# Patient Record
Sex: FEMALE | Race: WHITE | NOT HISPANIC OR LATINO | Employment: OTHER | URBAN - METROPOLITAN AREA
[De-identification: names, ages, dates, MRNs, and addresses within clinical notes are randomized per-mention and may not be internally consistent; named-entity substitution may affect disease eponyms.]

---

## 2021-05-14 ENCOUNTER — OFFICE VISIT (OUTPATIENT)
Dept: GASTROENTEROLOGY | Facility: CLINIC | Age: 76
End: 2021-05-14

## 2021-05-14 VITALS
DIASTOLIC BLOOD PRESSURE: 74 MMHG | TEMPERATURE: 97.9 F | HEIGHT: 62 IN | WEIGHT: 161.2 LBS | HEART RATE: 69 BPM | SYSTOLIC BLOOD PRESSURE: 154 MMHG | BODY MASS INDEX: 29.66 KG/M2

## 2021-05-14 DIAGNOSIS — R19.7 DIARRHEA, UNSPECIFIED TYPE: ICD-10-CM

## 2021-05-14 DIAGNOSIS — R10.84 GENERALIZED ABDOMINAL PAIN: Primary | ICD-10-CM

## 2021-05-14 PROCEDURE — 99204 OFFICE O/P NEW MOD 45 MIN: CPT | Performed by: NURSE PRACTITIONER

## 2021-05-14 RX ORDER — DICYCLOMINE HCL 20 MG
1 TABLET ORAL DAILY
COMMUNITY
Start: 2021-03-05 | End: 2021-09-14

## 2021-05-14 RX ORDER — LOSARTAN POTASSIUM 100 MG/1
1 TABLET ORAL DAILY
COMMUNITY
End: 2021-09-14

## 2021-05-14 RX ORDER — ZOLPIDEM TARTRATE 10 MG/1
1 TABLET ORAL DAILY
COMMUNITY
Start: 2021-04-26

## 2021-05-14 RX ORDER — CLONAZEPAM 1 MG/1
1 TABLET ORAL DAILY
COMMUNITY
Start: 2021-04-26

## 2021-05-14 RX ORDER — VERAPAMIL HYDROCHLORIDE 240 MG/1
1 TABLET, FILM COATED, EXTENDED RELEASE ORAL DAILY
COMMUNITY
Start: 2021-03-15

## 2021-05-14 RX ORDER — MONTELUKAST SODIUM 10 MG/1
1 TABLET ORAL DAILY
COMMUNITY
Start: 2021-04-15

## 2021-05-14 RX ORDER — MESALAMINE 1.2 G/1
1200 TABLET, DELAYED RELEASE ORAL 2 TIMES DAILY
COMMUNITY
End: 2021-05-28 | Stop reason: SDUPTHER

## 2021-05-14 RX ORDER — ATORVASTATIN CALCIUM 20 MG/1
1 TABLET, FILM COATED ORAL DAILY
COMMUNITY
Start: 2021-03-15

## 2021-05-17 ENCOUNTER — OUTSIDE FACILITY SERVICE (OUTPATIENT)
Dept: GASTROENTEROLOGY | Facility: CLINIC | Age: 76
End: 2021-05-17

## 2021-05-17 PROCEDURE — 45380 COLONOSCOPY AND BIOPSY: CPT | Performed by: INTERNAL MEDICINE

## 2021-05-28 ENCOUNTER — LAB (OUTPATIENT)
Dept: LAB | Facility: HOSPITAL | Age: 76
End: 2021-05-28

## 2021-05-28 ENCOUNTER — OFFICE VISIT (OUTPATIENT)
Dept: GASTROENTEROLOGY | Facility: CLINIC | Age: 76
End: 2021-05-28

## 2021-05-28 VITALS
TEMPERATURE: 97.7 F | DIASTOLIC BLOOD PRESSURE: 62 MMHG | HEIGHT: 62 IN | BODY MASS INDEX: 30.14 KG/M2 | WEIGHT: 163.8 LBS | HEART RATE: 70 BPM | SYSTOLIC BLOOD PRESSURE: 141 MMHG

## 2021-05-28 DIAGNOSIS — K50.00 CROHN'S DISEASE OF SMALL INTESTINE WITHOUT COMPLICATION (HCC): ICD-10-CM

## 2021-05-28 DIAGNOSIS — K50.00 CROHN'S DISEASE OF SMALL INTESTINE WITHOUT COMPLICATION (HCC): Primary | ICD-10-CM

## 2021-05-28 LAB
ALBUMIN SERPL-MCNC: 4.2 G/DL (ref 3.5–5.2)
ALBUMIN/GLOB SERPL: 1.7 G/DL
ALP SERPL-CCNC: 114 U/L (ref 39–117)
ALT SERPL W P-5'-P-CCNC: 20 U/L (ref 1–33)
ANION GAP SERPL CALCULATED.3IONS-SCNC: 10.1 MMOL/L (ref 5–15)
AST SERPL-CCNC: 22 U/L (ref 1–32)
BASOPHILS # BLD AUTO: 0.05 10*3/MM3 (ref 0–0.2)
BASOPHILS NFR BLD AUTO: 0.6 % (ref 0–1.5)
BILIRUB SERPL-MCNC: 0.6 MG/DL (ref 0–1.2)
BUN SERPL-MCNC: 5 MG/DL (ref 8–23)
BUN/CREAT SERPL: 7.9 (ref 7–25)
CALCIUM SPEC-SCNC: 9.6 MG/DL (ref 8.6–10.5)
CHLORIDE SERPL-SCNC: 100 MMOL/L (ref 98–107)
CO2 SERPL-SCNC: 27.9 MMOL/L (ref 22–29)
CREAT SERPL-MCNC: 0.63 MG/DL (ref 0.57–1)
DEPRECATED RDW RBC AUTO: 46.8 FL (ref 37–54)
EOSINOPHIL # BLD AUTO: 0.27 10*3/MM3 (ref 0–0.4)
EOSINOPHIL NFR BLD AUTO: 3.2 % (ref 0.3–6.2)
ERYTHROCYTE [DISTWIDTH] IN BLOOD BY AUTOMATED COUNT: 13.7 % (ref 12.3–15.4)
GFR SERPL CREATININE-BSD FRML MDRD: 92 ML/MIN/1.73
GLOBULIN UR ELPH-MCNC: 2.5 GM/DL
GLUCOSE SERPL-MCNC: 78 MG/DL (ref 65–99)
HCT VFR BLD AUTO: 42.3 % (ref 34–46.6)
HGB BLD-MCNC: 14.3 G/DL (ref 12–15.9)
IMM GRANULOCYTES # BLD AUTO: 0.03 10*3/MM3 (ref 0–0.05)
IMM GRANULOCYTES NFR BLD AUTO: 0.4 % (ref 0–0.5)
LYMPHOCYTES # BLD AUTO: 2.31 10*3/MM3 (ref 0.7–3.1)
LYMPHOCYTES NFR BLD AUTO: 27.3 % (ref 19.6–45.3)
MCH RBC QN AUTO: 31.4 PG (ref 26.6–33)
MCHC RBC AUTO-ENTMCNC: 33.8 G/DL (ref 31.5–35.7)
MCV RBC AUTO: 93 FL (ref 79–97)
MONOCYTES # BLD AUTO: 0.66 10*3/MM3 (ref 0.1–0.9)
MONOCYTES NFR BLD AUTO: 7.8 % (ref 5–12)
NEUTROPHILS NFR BLD AUTO: 5.15 10*3/MM3 (ref 1.7–7)
NEUTROPHILS NFR BLD AUTO: 60.7 % (ref 42.7–76)
NRBC BLD AUTO-RTO: 0 /100 WBC (ref 0–0.2)
PLATELET # BLD AUTO: 339 10*3/MM3 (ref 140–450)
PMV BLD AUTO: 9.3 FL (ref 6–12)
POTASSIUM SERPL-SCNC: 4.2 MMOL/L (ref 3.5–5.2)
PROT SERPL-MCNC: 6.7 G/DL (ref 6–8.5)
RBC # BLD AUTO: 4.55 10*6/MM3 (ref 3.77–5.28)
SODIUM SERPL-SCNC: 138 MMOL/L (ref 136–145)
WBC # BLD AUTO: 8.47 10*3/MM3 (ref 3.4–10.8)

## 2021-05-28 PROCEDURE — 99214 OFFICE O/P EST MOD 30 MIN: CPT | Performed by: NURSE PRACTITIONER

## 2021-05-28 PROCEDURE — 36415 COLL VENOUS BLD VENIPUNCTURE: CPT

## 2021-05-28 PROCEDURE — 85025 COMPLETE CBC W/AUTO DIFF WBC: CPT

## 2021-05-28 PROCEDURE — 80053 COMPREHEN METABOLIC PANEL: CPT

## 2021-05-28 PROCEDURE — 82542 COL CHROMOTOGRAPHY QUAL/QUAN: CPT

## 2021-05-28 PROCEDURE — 82657 ENZYME CELL ACTIVITY: CPT

## 2021-05-28 RX ORDER — ONDANSETRON 4 MG/1
1 TABLET, FILM COATED ORAL DAILY
COMMUNITY
Start: 2021-05-20

## 2021-05-28 RX ORDER — MESALAMINE 1.2 G/1
2400 TABLET, DELAYED RELEASE ORAL DAILY
Qty: 60 TABLET | Refills: 5 | Status: SHIPPED | OUTPATIENT
Start: 2021-05-28 | End: 2021-09-14

## 2021-06-07 LAB — REF LAB TEST METHOD: NORMAL

## 2021-06-08 RX ORDER — MERCAPTOPURINE 50 MG/1
50 TABLET ORAL DAILY
Qty: 90 TABLET | Refills: 0 | Status: SHIPPED | OUTPATIENT
Start: 2021-06-08 | End: 2021-09-14

## 2021-06-11 ENCOUNTER — TELEPHONE (OUTPATIENT)
Dept: GASTROENTEROLOGY | Facility: CLINIC | Age: 76
End: 2021-06-11

## 2021-06-11 NOTE — TELEPHONE ENCOUNTER
MRS HERNANDEZ CALLED STATES SHE SPOKE WITH TIA YESTERDAY ABOUT SOME NEW MEDICINE THAT SHE IS TO TAKE AND HAS QUESTIONS ABOUT. TIA WAS TO GET BACK WITH HER AND DIDN'T. PATIENT UPSET. PRETTY IS GOING TO SPEAK TO ANTIONETTE AND GIVE MRS. HERNANDEZ A CALL.

## 2021-07-03 ENCOUNTER — TELEPHONE (OUTPATIENT)
Dept: GASTROENTEROLOGY | Facility: OTHER | Age: 76
End: 2021-07-03

## 2021-07-03 NOTE — TELEPHONE ENCOUNTER
Patient called complains of increased diarrhea.  She has history of possible Crohn's disease disease diagnosed by small bowel capsule endoscopy 2 years ago.  She was recently started on 6-MP as well as mesalamine.  She complains of being weak and having a lot of diarrhea.  No fever chills.  States she also has abdominal distention.  I advised her to come to the ER here for further evaluation.  She states would be difficult for her to get here.  She will probably go to the ER at [place].    If she does arrive.  Needs evaluation with stool studies for C. difficile, GI PCR, fecal calprotectin and fecal lactoferrin and probably small bowel enteroscopy based on physical exam and stool studies

## 2021-07-30 ENCOUNTER — OFFICE VISIT (OUTPATIENT)
Dept: GASTROENTEROLOGY | Facility: CLINIC | Age: 76
End: 2021-07-30

## 2021-07-30 VITALS
TEMPERATURE: 97.1 F | WEIGHT: 158 LBS | BODY MASS INDEX: 28.9 KG/M2 | HEART RATE: 67 BPM | SYSTOLIC BLOOD PRESSURE: 164 MMHG | DIASTOLIC BLOOD PRESSURE: 68 MMHG

## 2021-07-30 DIAGNOSIS — R19.7 DIARRHEA, UNSPECIFIED TYPE: ICD-10-CM

## 2021-07-30 DIAGNOSIS — R10.84 GENERALIZED ABDOMINAL PAIN: Primary | ICD-10-CM

## 2021-07-30 DIAGNOSIS — K59.00 CONSTIPATION, UNSPECIFIED CONSTIPATION TYPE: ICD-10-CM

## 2021-07-30 PROCEDURE — 99213 OFFICE O/P EST LOW 20 MIN: CPT | Performed by: NURSE PRACTITIONER

## 2021-07-30 RX ORDER — AMITRIPTYLINE HYDROCHLORIDE 25 MG/1
TABLET, FILM COATED ORAL
COMMUNITY
Start: 2021-07-29 | End: 2021-09-14 | Stop reason: ALTCHOICE

## 2021-08-05 NOTE — PROGRESS NOTES
GASTROENTEROLOGY OFFICE NOTE  Marisela Hartman  [id number]  1945    CARE TEAM  Patient Care Team:  Sissy Farris as PCP - General (Family Medicine)    Referring Provider: Sissy Farris     Chief Complaint   Patient presents with   • Follow-up   • Crohn's Disease        HISTORY OF PRESENT ILLNESS:  Ms. Hartman presents in follow-up for complaints of nausea, vomiting and diarrhea with history of suspected Crohn's disease of the small bowel per capsule endoscopy per Dr. Claudio Riddle.  She has been treated with mesalamine with minimal improvement.  She reports that sometimes she has as many as 10 bowel movements a day and other times she goes 3 to 4 days with normal formed stool.     Colonoscopy in May 2021 by Dr. Calderon was normal, no signs of inflammatory bowel disease however, she has a very tortuous colon.  Fecal calprotectin in June 2021 was 96, borderline elevated.  We opted to begin 6-MP at that time.  She reports that the 6-MP gave her increased diarrhea, made her dizzy and she had worsening abdominal cramps and felt as if her bones were aching while taking this.  She has since discontinued this.  She relays with me today a discussion she had with Dr. Calderon at the time of her colonoscopy that given the fact that her colon is so very torturous it is possible that she is having fecal impactions in the torturous areas and that eventually the only stool that is passable has liquefied causing her complaint of diarrhea.  She has since been taking Metamucil and MiraLAX.  She is actually been doing much better the past 10 days after stopping 6-MP and with a regimen of Metamucil and MiraLAX.    PAST MEDICAL HISTORY  Past Medical History:   Diagnosis Date   • Anxiety    • Crohn's disease (CMS/HCC)    • Heart abnormality    • Hyperlipidemia    • Hypertension    • Seasonal allergies         PAST SURGICAL HISTORY  Past Surgical History:   Procedure Laterality Date   • APPENDECTOMY           MEDICATIONS:    Current Outpatient Medications:   •  atorvastatin (LIPITOR) 20 MG tablet, Take 1 tablet by mouth Daily., Disp: , Rfl:   •  clonazePAM (KlonoPIN) 1 MG tablet, Take 1 tablet by mouth Daily., Disp: , Rfl:   •  mesalamine (LIALDA) 1.2 g EC tablet, Take 2 tablets by mouth Daily., Disp: 60 tablet, Rfl: 5  •  montelukast (SINGULAIR) 10 MG tablet, Take 1 tablet by mouth Daily., Disp: , Rfl:   •  ondansetron (ZOFRAN) 4 MG tablet, Take 1 tablet by mouth Daily., Disp: , Rfl:   •  verapamil SR (CALAN-SR) 240 MG CR tablet, Take 1 tablet by mouth Daily., Disp: , Rfl:   •  zolpidem (AMBIEN) 10 MG tablet, Take 1 tablet by mouth Daily., Disp: , Rfl:   •  amitriptyline (ELAVIL) 25 MG tablet, , Disp: , Rfl:   •  dicyclomine (BENTYL) 20 MG tablet, Take 1 tablet by mouth Daily., Disp: , Rfl:   •  losartan (COZAAR) 100 MG tablet, Take 1 tablet by mouth Daily., Disp: , Rfl:   •  mercaptopurine (PURINETHOL) 50 MG chemo tablet, Take 1 tablet by mouth Daily., Disp: 90 tablet, Rfl: 0  •  Nystatin (magic mouthwash), , Disp: , Rfl:     ALLERGIES  Allergies   Allergen Reactions   • Penicillins Other (See Comments)   • Sulfa Antibiotics Other (See Comments)       FAMILY HISTORY:  Family History   Problem Relation Age of Onset   • Colon cancer Neg Hx    • Colon polyps Neg Hx        SOCIAL HISTORY  Social History     Socioeconomic History   • Marital status:      Spouse name: Not on file   • Number of children: Not on file   • Years of education: Not on file   • Highest education level: Not on file   Tobacco Use   • Smoking status: Never Smoker   • Smokeless tobacco: Never Used   Vaping Use   • Vaping Use: Never used   Substance and Sexual Activity   • Alcohol use: Never   • Drug use: Never   • Sexual activity: Defer       REVIEW OF SYSTEMS  Review of Systems   Constitutional: Negative for activity change, appetite change, chills, diaphoresis, fatigue, fever, unexpected weight gain and unexpected weight loss.   HENT:  Negative for trouble swallowing and voice change.    Gastrointestinal: Positive for abdominal pain, constipation, diarrhea and nausea. Negative for abdominal distention, anal bleeding, blood in stool, rectal pain, vomiting, GERD and indigestion.         PHYSICAL EXAM   /68 (BP Location: Left arm, Patient Position: Sitting, Cuff Size: Adult)   Pulse 67   Temp 97.1 °F (36.2 °C) (Temporal)   Wt 71.7 kg (158 lb)   BMI 28.90 kg/m²   Physical Exam  Constitutional:       General: She is not in acute distress.     Appearance: She is well-developed.   HENT:      Head: Normocephalic and atraumatic.      Nose: Nose normal.   Eyes:      Conjunctiva/sclera: Conjunctivae normal.      Pupils: Pupils are equal, round, and reactive to light.   Pulmonary:      Effort: Pulmonary effort is normal.   Abdominal:      General: There is no distension.      Palpations: Abdomen is soft.   Neurological:      Mental Status: She is alert and oriented to person, place, and time.   Psychiatric:         Behavior: Behavior normal.         Judgment: Judgment normal.           Results Review:  Detailed in HPI    ASSESSMENT / PLAN  1.  Generalized abdominal pain  2.  Alternating constipation diarrhea  There is been suspicion of small bowel Crohn's as evidenced by capsule endoscopy showing some ulcerations.  She has had an elevated fecal calprotectin in the past of 120 and more recently 96.  She has not had any improvement in her symptoms with mesalamine nor 6-MP but had most improvement in her symptoms with fiber and MiraLAX.  For now, we will continue this regimen and see if her symptoms continue to improve or progress.  -Fiber 30 g daily  -MiraLAX 17 g daily    Return in about 2 months (around 9/30/2021).    I discussed the patients findings and my recommendations with patient    YUDITH Harris

## 2021-09-14 ENCOUNTER — OFFICE VISIT (OUTPATIENT)
Dept: GASTROENTEROLOGY | Facility: CLINIC | Age: 76
End: 2021-09-14

## 2021-09-14 VITALS
SYSTOLIC BLOOD PRESSURE: 150 MMHG | BODY MASS INDEX: 28.72 KG/M2 | DIASTOLIC BLOOD PRESSURE: 60 MMHG | HEART RATE: 67 BPM | WEIGHT: 157 LBS

## 2021-09-14 DIAGNOSIS — R19.7 DIARRHEA, UNSPECIFIED TYPE: Primary | ICD-10-CM

## 2021-09-14 DIAGNOSIS — K59.00 CONSTIPATION, UNSPECIFIED CONSTIPATION TYPE: ICD-10-CM

## 2021-09-14 PROCEDURE — 99214 OFFICE O/P EST MOD 30 MIN: CPT | Performed by: NURSE PRACTITIONER

## 2021-09-14 RX ORDER — PLECANATIDE 3 MG/1
3 TABLET ORAL DAILY
Qty: 90 TABLET | Refills: 3 | Status: SHIPPED | OUTPATIENT
Start: 2021-09-14 | End: 2022-06-02

## 2021-09-14 RX ORDER — ALBUTEROL SULFATE 90 UG/1
AEROSOL, METERED RESPIRATORY (INHALATION)
COMMUNITY
Start: 2021-08-12

## 2021-09-14 RX ORDER — SUCRALFATE 1 G/1
TABLET ORAL
COMMUNITY
Start: 2021-09-10 | End: 2022-06-02

## 2021-09-14 RX ORDER — AMITRIPTYLINE HYDROCHLORIDE 10 MG/1
TABLET, FILM COATED ORAL
COMMUNITY
Start: 2021-08-18

## 2021-09-15 NOTE — PROGRESS NOTES
GASTROENTEROLOGY OFFICE NOTE  Marisela Hartman  [id number]  1945    CARE TEAM  Patient Care Team:  Sissy Farris as PCP - General (Family Medicine)    Referring Provider: Sissy Farris     Chief Complaint   Patient presents with   • Constipation   • Diarrhea        HISTORY OF PRESENT ILLNESS:  Ms. Hartman presents in follow-up for complaints of nausea, vomiting and diarrhea with history of suspected Crohn's disease of the small bowel per capsule endoscopy per Dr. Claudio Riddle.  She has been treated with mesalamine with minimal improvement.  She reports that sometimes she has as many as 10 bowel movements a day and other times she goes 3 to 4 days with normal formed stool.    Colonoscopy in May 2021 by Dr. Calderon was normal, no signs of inflammatory bowel disease however, she has a very tortuous colon.  Fecal calprotectin in June 2021 was 96, borderline elevated.  We opted to begin 6-MP at that time.  She reports that the 6-MP gave her increased diarrhea, made her dizzy and she had worsening abdominal cramps and felt as if her bones were aching while taking this.  6-MP was discontinued.    After colonoscopy in May 2021, after a very tortuous colon was noted it was suggested that she was having fecal impactions in the torturous areas and that eventually the only stool that is passable has liquefied causing her complaints of diarrhea.  She began taking Metamucil and MiraLAX.  She was doing much better at her follow-up visit in August.    Today, she reports that she is having a bowel movement every day although it is very small volume and typically hard balls of stool.  About once weekly she has an episode of diarrhea having watery, explosive stools that do not stop until she takes Imodium.  She is taking 1, sometimes 2 Imodium's every time this occurs.       PAST MEDICAL HISTORY  Past Medical History:   Diagnosis Date   • Anxiety    • Crohn's disease (CMS/HCC)    • Heart abnormality    • Hyperlipidemia     • Hypertension    • Seasonal allergies         PAST SURGICAL HISTORY  Past Surgical History:   Procedure Laterality Date   • APPENDECTOMY          MEDICATIONS:    Current Outpatient Medications:   •  atorvastatin (LIPITOR) 20 MG tablet, Take 1 tablet by mouth Daily., Disp: , Rfl:   •  clonazePAM (KlonoPIN) 1 MG tablet, Take 1 tablet by mouth Daily., Disp: , Rfl:   •  montelukast (SINGULAIR) 10 MG tablet, Take 1 tablet by mouth Daily., Disp: , Rfl:   •  ondansetron (ZOFRAN) 4 MG tablet, Take 1 tablet by mouth Daily., Disp: , Rfl:   •  verapamil SR (CALAN-SR) 240 MG CR tablet, Take 1 tablet by mouth Daily., Disp: , Rfl:   •  zolpidem (AMBIEN) 10 MG tablet, Take 1 tablet by mouth Daily., Disp: , Rfl:   •  albuterol sulfate  (90 Base) MCG/ACT inhaler, , Disp: , Rfl:   •  amitriptyline (ELAVIL) 10 MG tablet, , Disp: , Rfl:   •  Plecanatide (Trulance) 3 MG tablet, Take 1 tablet by mouth Daily., Disp: 90 tablet, Rfl: 3  •  sucralfate (CARAFATE) 1 g tablet, , Disp: , Rfl:     ALLERGIES  Allergies   Allergen Reactions   • Penicillins Other (See Comments)   • Sulfa Antibiotics Other (See Comments)       FAMILY HISTORY:  Family History   Problem Relation Age of Onset   • Colon cancer Neg Hx    • Colon polyps Neg Hx        SOCIAL HISTORY  Social History     Socioeconomic History   • Marital status:      Spouse name: Not on file   • Number of children: Not on file   • Years of education: Not on file   • Highest education level: Not on file   Tobacco Use   • Smoking status: Never Smoker   • Smokeless tobacco: Never Used   Vaping Use   • Vaping Use: Never used   Substance and Sexual Activity   • Alcohol use: Never   • Drug use: Never   • Sexual activity: Defer       REVIEW OF SYSTEMS  Review of Systems   Constitutional: Negative for activity change, appetite change, chills, diaphoresis, fatigue, fever, unexpected weight gain and unexpected weight loss.   HENT: Negative for trouble swallowing and voice change.     Gastrointestinal: Positive for abdominal pain, constipation and diarrhea. Negative for abdominal distention, anal bleeding, blood in stool, nausea, rectal pain, vomiting, GERD and indigestion.         PHYSICAL EXAM   /60   Pulse 67   Wt 71.2 kg (157 lb)   BMI 28.72 kg/m²   Physical Exam  Constitutional:       General: She is not in acute distress.     Appearance: She is well-developed.   HENT:      Head: Normocephalic and atraumatic.      Nose: Nose normal.   Eyes:      Conjunctiva/sclera: Conjunctivae normal.      Pupils: Pupils are equal, round, and reactive to light.   Pulmonary:      Effort: Pulmonary effort is normal.   Abdominal:      General: There is no distension.      Palpations: Abdomen is soft.   Neurological:      Mental Status: She is alert and oriented to person, place, and time.   Psychiatric:         Behavior: Behavior normal.         Judgment: Judgment normal.         ASSESSMENT / PLAN  1.  Constipation with diarrhea  -Continue Metamucil 30 g daily  -Discontinue MiraLAX  -Begin Trulance 3 mg daily, if Trulance induces diarrhea then advised patient to discontinue and resume MiraLAX 17 g twice daily rather than once times daily.    Return in about 8 weeks (around 11/9/2021).    I discussed the patients findings and my recommendations with patient    YUDITH Harris

## 2021-11-09 ENCOUNTER — TELEPHONE (OUTPATIENT)
Dept: GASTROENTEROLOGY | Facility: CLINIC | Age: 76
End: 2021-11-09

## 2021-11-09 NOTE — TELEPHONE ENCOUNTER
"[facility] link sent to patient for her appointment today per her request at the time of scheduling appointment.  No answer on [employer] link, no answer from phone call \"nudge\".  "

## 2022-06-02 ENCOUNTER — HOSPITAL ENCOUNTER (OUTPATIENT)
Dept: GENERAL RADIOLOGY | Facility: HOSPITAL | Age: 77
Discharge: HOME | End: 2022-06-02

## 2022-06-02 ENCOUNTER — LAB (OUTPATIENT)
Dept: LAB | Facility: HOSPITAL | Age: 77
End: 2022-06-02

## 2022-06-02 ENCOUNTER — OFFICE VISIT (OUTPATIENT)
Dept: GASTROENTEROLOGY | Facility: CLINIC | Age: 77
End: 2022-06-02

## 2022-06-02 VITALS
DIASTOLIC BLOOD PRESSURE: 80 MMHG | BODY MASS INDEX: 27.98 KG/M2 | WEIGHT: 153 LBS | SYSTOLIC BLOOD PRESSURE: 138 MMHG | HEART RATE: 67 BPM

## 2022-06-02 DIAGNOSIS — R10.30 LOWER ABDOMINAL PAIN: ICD-10-CM

## 2022-06-02 DIAGNOSIS — R19.7 DIARRHEA, UNSPECIFIED TYPE: ICD-10-CM

## 2022-06-02 DIAGNOSIS — R19.7 DIARRHEA, UNSPECIFIED TYPE: Primary | ICD-10-CM

## 2022-06-02 LAB — TSH SERPL DL<=0.05 MIU/L-ACNC: 1.77 UIU/ML (ref 0.27–4.2)

## 2022-06-02 PROCEDURE — 86258 DGP ANTIBODY EACH IG CLASS: CPT | Performed by: INTERNAL MEDICINE

## 2022-06-02 PROCEDURE — 86364 TISS TRNSGLTMNASE EA IG CLAS: CPT | Performed by: INTERNAL MEDICINE

## 2022-06-02 PROCEDURE — 99214 OFFICE O/P EST MOD 30 MIN: CPT | Performed by: INTERNAL MEDICINE

## 2022-06-02 PROCEDURE — 36415 COLL VENOUS BLD VENIPUNCTURE: CPT | Performed by: INTERNAL MEDICINE

## 2022-06-02 PROCEDURE — 84443 ASSAY THYROID STIM HORMONE: CPT

## 2022-06-02 PROCEDURE — 86231 EMA EACH IG CLASS: CPT | Performed by: INTERNAL MEDICINE

## 2022-06-02 PROCEDURE — 82784 ASSAY IGA/IGD/IGG/IGM EACH: CPT | Performed by: INTERNAL MEDICINE

## 2022-06-02 PROCEDURE — 74018 RADEX ABDOMEN 1 VIEW: CPT

## 2022-06-02 NOTE — PROGRESS NOTES
PCP:  Sissy Farris referring provider defined for this encounter.    Chief Complaint   Patient presents with   • Abdominal Pain   • Diarrhea        HPI   The patient is a 76-year-old female known to me.  She has an alternating bowel pattern.  She will have diarrhea for a few days.  Sometimes she will take something for the diarrhea.  She may not have much in the way of bowel movements for even as long as a week at times.  Sometimes it is just a few days.  For example yesterday she had diarrhea issues.  Today she has not had a bowel movement.  She has no significant bleeding.  She was on a program of MiraLAX and Metamucil daily.  If she stays on the program she does fairly well.  She had a colonoscopy 5/17/2021 which showed her to have a tortuous colon, early diverticulosis, and internal hemorrhoids: She did have a CAT scan in March of this year but it was in [facility].  I do not have the report or the images.  She had another CAT scan done in [facility] but again I do not have that result.  That was done last summer.  She occasionally will get nauseated when she is having episodes of diarrhea.  She is not eating well but she has not lost a lot of weight.  She is lost about 5 pounds over the past year.    Allergies   Allergen Reactions   • Penicillins Other (See Comments)   • Sulfa Antibiotics Other (See Comments)          Current Outpatient Medications:   •  albuterol sulfate  (90 Base) MCG/ACT inhaler, , Disp: , Rfl:   •  amitriptyline (ELAVIL) 10 MG tablet, , Disp: , Rfl:   •  atorvastatin (LIPITOR) 20 MG tablet, Take 1 tablet by mouth Daily., Disp: , Rfl:   •  clonazePAM (KlonoPIN) 1 MG tablet, Take 1 tablet by mouth Daily., Disp: , Rfl:   •  Loperamide HCl (IMODIUM A-D PO), Imodium A-D  as needed as directed, Disp: , Rfl:   •  montelukast (SINGULAIR) 10 MG tablet, Take 1 tablet by mouth Daily., Disp: , Rfl:   •  ondansetron (ZOFRAN) 4 MG tablet, Take 1 tablet by mouth Daily.,  Disp: , Rfl:   •  verapamil SR (CALAN-SR) 240 MG CR tablet, Take 1 tablet by mouth Daily., Disp: , Rfl:   •  zolpidem (AMBIEN) 10 MG tablet, Take 1 tablet by mouth Daily., Disp: , Rfl:      Past Medical History:   Diagnosis Date   • Anxiety    • Crohn's disease (HCC)    • Heart abnormality    • Hyperlipidemia    • Hypertension    • Seasonal allergies        Past Surgical History:   Procedure Laterality Date   • APPENDECTOMY          Social History     Socioeconomic History   • Marital status:    Tobacco Use   • Smoking status: Never Smoker   • Smokeless tobacco: Never Used   Vaping Use   • Vaping Use: Never used   Substance and Sexual Activity   • Alcohol use: Never   • Drug use: Never   • Sexual activity: Defer        Family History   Problem Relation Age of Onset   • Colon cancer Neg Hx    • Colon polyps Neg Hx         Review of Systems     Vitals:    06/02/22 1522   BP: 138/80   Pulse: 67        Physical Exam   General Appearance: Alert, in no acute distress   Head: Normocephalic, without obvious abnormality, atraumatic   Eyes: Lids and lashes normal, conjunctivae and sclerae normal, no icterus, no pallor, corneas clear, PERRLA   Ears: Ears appear intact with no abnormalities noted   Extremities: Moves all extremities well, no edema, no cyanosis, no redness   Skin: No bleeding, bruising or rash   Neurologic: Cranial nerves 2 - 12 grossly intact, no focal deficits         Diagnoses and all orders for this visit:    1. Diarrhea, unspecified type (Primary)  -     Celiac Comprehensive Panel  -     Cancel: FL Upper GI Single Contrast SBFT; Future  -     TSH; Future  -     Calprotectin, Fecal - Stool, Per Rectum; Future  -     XR Abdomen KUB; Future    2. Lower abdominal pain    Impressions and plan #1 abdominal pain with an alternating bowel pattern: This really does not sound like Crohn's disease.  We could evaluate the small bowel.  She has had a small bowel capsule endoscopy in [place] 3 or 4 years  ago.  I do not have that report.  Usually, in patients with Crohn's disease of the small bowel and diarrhea, the diarrhea is daily.  They usually are not in a pattern where they have diarrhea for several days and then do not have a bowel movement for several days.  It almost sounds like she could be getting constipated and then having overflow diarrhea.  I will check a fecal calprotectin.  I will check a TSH and celiac panel.  I would like to check an x-ray of the abdomen.  I am curious to see if there is a lot of solid stool in the abdomen.  She did have episodes of diarrhea yesterday.  It is possible she purged out the stool yesterday and there will be a lot today.  If there was a lot of stool in the colon 1 would wonder if she was actually constipated and then getting overflow diarrhea at times.  I would like her to get the studies here if possible so that I can look at the actual x-rays.  She has had CAT scans done in several places but unfortunately I do not have access to either reports or more importantly the images.    Siva Calderon MD

## 2022-06-05 LAB
ENDOMYSIUM IGA SER QL: NEGATIVE
GLIADIN PEPTIDE IGA SER-ACNC: 4 UNITS (ref 0–19)
GLIADIN PEPTIDE IGG SER-ACNC: 2 UNITS (ref 0–19)
IGA SERPL-MCNC: 69 MG/DL (ref 64–422)
TTG IGA SER-ACNC: <2 U/ML (ref 0–3)
TTG IGG SER-ACNC: <2 U/ML (ref 0–5)

## 2022-10-18 ENCOUNTER — OFFICE VISIT (OUTPATIENT)
Dept: GASTROENTEROLOGY | Facility: CLINIC | Age: 77
End: 2022-10-18

## 2022-10-18 VITALS — HEIGHT: 62 IN | WEIGHT: 156 LBS | BODY MASS INDEX: 28.71 KG/M2

## 2022-10-18 DIAGNOSIS — K59.00 CONSTIPATION, UNSPECIFIED CONSTIPATION TYPE: Primary | ICD-10-CM

## 2022-10-18 DIAGNOSIS — R19.7 DIARRHEA, UNSPECIFIED TYPE: ICD-10-CM

## 2022-10-18 DIAGNOSIS — E73.9 LACTOSE INTOLERANCE: ICD-10-CM

## 2022-10-18 PROCEDURE — 99214 OFFICE O/P EST MOD 30 MIN: CPT | Performed by: INTERNAL MEDICINE

## 2022-10-18 RX ORDER — OMEPRAZOLE 40 MG/1
CAPSULE, DELAYED RELEASE ORAL
COMMUNITY

## 2022-10-18 NOTE — PROGRESS NOTES
PCP:  Sissy Farris referring provider defined for this encounter.    Chief Complaint   Patient presents with   • Follow-up        HPI   The patient is a 76-year-old known to me.  She has an alternating bowel pattern.  She seems to do better if she is on MiraLAX.  She has trouble if she she eats lactose or if she eats fatty meals.  Occasionally the MiraLAX gets are running too much and she needs to back off on that and try some Imodium.  She would like to be able to eat fattier meals if possible.  She her diet is relatively bland and she does good with that.    Allergies   Allergen Reactions   • Penicillins Other (See Comments)   • Sulfa Antibiotics Other (See Comments)          Current Outpatient Medications:   •  albuterol sulfate  (90 Base) MCG/ACT inhaler, , Disp: , Rfl:   •  amitriptyline (ELAVIL) 10 MG tablet, , Disp: , Rfl:   •  atorvastatin (LIPITOR) 20 MG tablet, Take 1 tablet by mouth Daily., Disp: , Rfl:   •  clonazePAM (KlonoPIN) 1 MG tablet, Take 1 tablet by mouth Daily., Disp: , Rfl:   •  Loperamide HCl (IMODIUM A-D PO), Imodium A-D  as needed as directed, Disp: , Rfl:   •  montelukast (SINGULAIR) 10 MG tablet, Take 1 tablet by mouth Daily., Disp: , Rfl:   •  omeprazole (priLOSEC) 40 MG capsule, omeprazole 40 mg capsule,delayed release  Take 1 capsule(s) every day by oral route for 30 days., Disp: , Rfl:   •  ondansetron (ZOFRAN) 4 MG tablet, Take 1 tablet by mouth Daily., Disp: , Rfl:   •  polyethylene glycol (GoLYTELY) 236 g solution, Use as directed by provider, Disp: 4000 mL, Rfl: 0  •  verapamil SR (CALAN-SR) 240 MG CR tablet, Take 1 tablet by mouth Daily., Disp: , Rfl:   •  zolpidem (AMBIEN) 10 MG tablet, Take 1 tablet by mouth Daily., Disp: , Rfl:      Past Medical History:   Diagnosis Date   • Anxiety    • Crohn's disease (HCC)    • Heart abnormality    • Hyperlipidemia    • Hypertension    • Seasonal allergies        Past Surgical History:   Procedure Laterality Date   •  APPENDECTOMY          Social History     Socioeconomic History   • Marital status:    Tobacco Use   • Smoking status: Never   • Smokeless tobacco: Never   Vaping Use   • Vaping Use: Never used   Substance and Sexual Activity   • Alcohol use: Never   • Drug use: Never   • Sexual activity: Defer        Family History   Problem Relation Age of Onset   • Colon cancer Neg Hx    • Colon polyps Neg Hx         Review of Systems     There were no vitals filed for this visit.     Physical Exam   General Appearance: Alert, in no acute distress   Head: Normocephalic, without obvious abnormality, atraumatic   Eyes: Lids and lashes normal, conjunctivae and sclerae normal, no icterus, no pallor, corneas clear, PERRLA   Extremities: Moves all extremities well, no edema, no cyanosis, no redness   Skin: No bleeding, bruising or rash   Neurologic: Cranial nerves 2 - 12 grossly intact, no focal deficits         Diagnoses and all orders for this visit:    1. Constipation, unspecified constipation type (Primary)    2. Lactose intolerance    3. Diarrhea, unspecified type    Impressions and plan #1 constipation: I still think her primary issue is constipation.  She does get diarrhea with lactose products as well as fatty meals.  She would like to be able to liberalize her diet if she was able to.  I am going to give her some free Creon samples to see if that does not help.  She will take 2 if she is going to have a fatty meal.  It should help immediately if it is going to help.Her celiac panel was negative.  Her TSH was negative.  Her calprotectin was slightly elevated but colonoscopy 5/17/2021 showed just some early diverticulosis and internal hemorrhoids.  She can try flaxseed to her stool softener she would like.  If the MiraLAX is working I think that is appropriate as well.    #2 lactose intolerance: She does drink lactose free milk.  She does lactose-free cheese but it does not taste good to her.  I doubt the Creon will help  with the cheese but she can always try it.    #3 fatty food intolerance: She has fatty food intolerance.  I Dianelys give her some samples of Creon just to see if it does not help with the diarrhea that she gets from foods heavier and fat.  It should either work well or not work.  If she thinks it is helping we can always get her prescription at that point.    Siva Calderon MD

## 2024-04-23 ENCOUNTER — OFFICE VISIT (OUTPATIENT)
Dept: GASTROENTEROLOGY | Facility: CLINIC | Age: 79
End: 2024-04-23
Payer: MEDICARE

## 2024-04-23 VITALS — HEIGHT: 62 IN | BODY MASS INDEX: 29.26 KG/M2 | WEIGHT: 159 LBS

## 2024-04-23 DIAGNOSIS — K59.00 CONSTIPATION, UNSPECIFIED CONSTIPATION TYPE: ICD-10-CM

## 2024-04-23 DIAGNOSIS — R19.7 DIARRHEA, UNSPECIFIED TYPE: Primary | ICD-10-CM

## 2024-04-23 PROCEDURE — 1159F MED LIST DOCD IN RCRD: CPT | Performed by: INTERNAL MEDICINE

## 2024-04-23 PROCEDURE — 99214 OFFICE O/P EST MOD 30 MIN: CPT | Performed by: INTERNAL MEDICINE

## 2024-04-23 PROCEDURE — 1160F RVW MEDS BY RX/DR IN RCRD: CPT | Performed by: INTERNAL MEDICINE

## 2024-04-23 RX ORDER — CETIRIZINE HYDROCHLORIDE 10 MG/1
10 TABLET ORAL
COMMUNITY

## 2024-04-23 NOTE — PROGRESS NOTES
PCP:  Sissy Farris referring provider defined for this encounter.    Chief Complaint   Patient presents with    Constipation    Diarrhea        HPI   The patient is a 78-year-old female who has an alternating bowel pattern.  She is using the MiraLAX when she is not having diarrhea.  She will have a pattern where she will have diarrhea for a week at a time and then she will be constipated for 4 to 5 days.  She will then get diarrhea again and takes Imodium.  She then gets constipated.  In the past have done an x-ray and she did have a moderate stool burden when she was having diarrhea which brings about the possibility that she is constipated and has diarrhea around the area of impaction.  She has not had a stool for 3 days and.  She did have a colonoscopy 5/17/21 which did not reveal any obvious abnormalities.  She has not been on antibiotics recently.    Allergies   Allergen Reactions    Penicillins Other (See Comments)    Sulfa Antibiotics Other (See Comments)          Current Outpatient Medications:     atorvastatin (LIPITOR) 20 MG tablet, Take 1 tablet by mouth Daily., Disp: , Rfl:     clonazePAM (KlonoPIN) 1 MG tablet, Take 1 tablet by mouth Daily., Disp: , Rfl:     Loperamide HCl (IMODIUM A-D PO), Imodium A-D  as needed as directed, Disp: , Rfl:     montelukast (SINGULAIR) 10 MG tablet, Take 1 tablet by mouth Daily., Disp: , Rfl:     ondansetron (ZOFRAN) 4 MG tablet, Take 1 tablet by mouth Daily., Disp: , Rfl:     verapamil SR (CALAN-SR) 120 MG CR tablet, Take 1 tablet by mouth Daily., Disp: , Rfl:     zolpidem (AMBIEN) 10 MG tablet, Take 1 tablet by mouth Daily., Disp: , Rfl:     cetirizine (zyrTEC) 10 MG tablet, 1 tablet., Disp: , Rfl:      Past Medical History:   Diagnosis Date    Anxiety     Crohn's disease     Heart abnormality     Hyperlipidemia     Hypertension     Seasonal allergies        Past Surgical History:   Procedure Laterality Date    APPENDECTOMY          Social History      Socioeconomic History    Marital status:    Tobacco Use    Smoking status: Never    Smokeless tobacco: Never   Vaping Use    Vaping status: Never Used   Substance and Sexual Activity    Alcohol use: Never    Drug use: Never    Sexual activity: Defer        Family History   Problem Relation Age of Onset    Colon cancer Neg Hx     Colon polyps Neg Hx         Review of Systems     There were no vitals filed for this visit.     Physical Exam  Constitutional:       General: She is not in acute distress.     Appearance: Normal appearance. She is not ill-appearing.   Neurological:      Mental Status: She is alert.          Diagnoses and all orders for this visit:    1. Diarrhea, unspecified type (Primary)    2. Constipation, unspecified constipation type    Impressions and plan #1 alternating diarrhea and constipation: I think if she had inflammatory bowel disease she would have diarrhea much more regularly.  In addition when we did it flatplate when she was having diarrhea in the past she had moderate stool burden.  This makes me think she is more constipated and when she finally does go she has significant diarrhea.  She may also have some overflow where the liquid stools going around the impaction.  If she has another attack of the diarrhea after several days we can repeat an x-ray and see if that still the case where she is not completely emptying.  In the meantime we will going to give her some Trulance to try.  She we will see if this improves the situation.  She can also use some fiber.  This would help both constipation and diarrhea.  Colonoscopy recently did not show any microscopic colitis or obvious abnormalities of significance.  Celiac disease in the past has been negative.    Siva Calderon MD

## 2024-04-23 NOTE — LETTER
April 23, 2024       No Recipients    Patient: Marisela Hartman   YOB: 1945   Date of Visit: 4/23/2024     Dear Sissy Farris:       Thank you for referring Marisela Hartman to me for evaluation. Below are the relevant portions of my assessment and plan of care.    If you have questions, please do not hesitate to call me. I look forward to following Marisela along with you.         Sincerely,        Siva Calderon MD        CC:   No Recipients    Siva Calderon MD  04/23/24 1641  Sign when Signing Visit  PCP:  Sissy Farris referring provider defined for this encounter.    Chief Complaint   Patient presents with   • Constipation   • Diarrhea        HPI   The patient is a 78-year-old female who has an alternating bowel pattern.  She is using the MiraLAX when she is not having diarrhea.  She will have a pattern where she will have diarrhea for a week at a time and then she will be constipated for 4 to 5 days.  She will then get diarrhea again and takes Imodium.  She then gets constipated.  In the past have done an x-ray and she did have a moderate stool burden when she was having diarrhea which brings about the possibility that she is constipated and has diarrhea around the area of impaction.  She has not had a stool for 3 days and.  She did have a colonoscopy 5/17/21 which did not reveal any obvious abnormalities.  She has not been on antibiotics recently.    Allergies   Allergen Reactions   • Penicillins Other (See Comments)   • Sulfa Antibiotics Other (See Comments)          Current Outpatient Medications:   •  atorvastatin (LIPITOR) 20 MG tablet, Take 1 tablet by mouth Daily., Disp: , Rfl:   •  clonazePAM (KlonoPIN) 1 MG tablet, Take 1 tablet by mouth Daily., Disp: , Rfl:   •  Loperamide HCl (IMODIUM A-D PO), Imodium A-D  as needed as directed, Disp: , Rfl:   •  montelukast (SINGULAIR) 10 MG tablet, Take 1 tablet by mouth Daily., Disp: , Rfl:   •  ondansetron (ZOFRAN) 4 MG  tablet, Take 1 tablet by mouth Daily., Disp: , Rfl:   •  verapamil SR (CALAN-SR) 120 MG CR tablet, Take 1 tablet by mouth Daily., Disp: , Rfl:   •  zolpidem (AMBIEN) 10 MG tablet, Take 1 tablet by mouth Daily., Disp: , Rfl:   •  cetirizine (zyrTEC) 10 MG tablet, 1 tablet., Disp: , Rfl:      Past Medical History:   Diagnosis Date   • Anxiety    • Crohn's disease    • Heart abnormality    • Hyperlipidemia    • Hypertension    • Seasonal allergies        Past Surgical History:   Procedure Laterality Date   • APPENDECTOMY          Social History     Socioeconomic History   • Marital status:    Tobacco Use   • Smoking status: Never   • Smokeless tobacco: Never   Vaping Use   • Vaping status: Never Used   Substance and Sexual Activity   • Alcohol use: Never   • Drug use: Never   • Sexual activity: Defer        Family History   Problem Relation Age of Onset   • Colon cancer Neg Hx    • Colon polyps Neg Hx         Review of Systems     There were no vitals filed for this visit.     Physical Exam  Constitutional:       General: She is not in acute distress.     Appearance: Normal appearance. She is not ill-appearing.   Neurological:      Mental Status: She is alert.          Diagnoses and all orders for this visit:    1. Diarrhea, unspecified type (Primary)    2. Constipation, unspecified constipation type    Impressions and plan #1 alternating diarrhea and constipation: I think if she had inflammatory bowel disease she would have diarrhea much more regularly.  In addition when we did it flatplate when she was having diarrhea in the past she had moderate stool burden.  This makes me think she is more constipated and when she finally does go she has significant diarrhea.  She may also have some overflow where the liquid stools going around the impaction.  If she has another attack of the diarrhea after several days we can repeat an x-ray and see if that still the case where she is not completely emptying.  In the  meantime we will going to give her some Trulance to try.  She we will see if this improves the situation.  She can also use some fiber.  This would help both constipation and diarrhea.  Colonoscopy recently did not show any microscopic colitis or obvious abnormalities of significance.  Celiac disease in the past has been negative.    Siva Calderon MD

## 2024-07-02 ENCOUNTER — TELEPHONE (OUTPATIENT)
Dept: GASTROENTEROLOGY | Facility: CLINIC | Age: 79
End: 2024-07-02
Payer: MEDICARE

## 2024-07-02 NOTE — TELEPHONE ENCOUNTER
Provider: DR. LOZADA    Caller: TABBY HERNANDEZ    Relationship to Patient: SELF    Phone Number: [phone]    Reason for Call: PT CALLED STATING THAT HER PCP RECOMMENDS THAT SHE STARTS PROLIA INFUSIONS// CONSIDERING HER GASTRO HEALTH, PT WOULD LIKE TO KNOW IF IT'S A GOOD IDEA FOR HER TO START PROLIA INFUSIONS// PLEASE CALL PT BACK

## 2024-07-05 NOTE — TELEPHONE ENCOUNTER
I returned patient phone call and informed her she may have the Prolia infusions and they should not interfere with her gastro issues per Dr Calderon. Patient verbalized understanding and had no further questions.

## 2025-03-17 ENCOUNTER — TELEPHONE (OUTPATIENT)
Dept: GASTROENTEROLOGY | Facility: CLINIC | Age: 80
End: 2025-03-17

## 2025-03-17 NOTE — TELEPHONE ENCOUNTER
Name: Marisela Hartman    Relationship: Self    Best Callback Number: [phone]    Patient would like to Reschedule their appointment. Unable to schedule within the 3 week timeframe.     Patient is not having any symptoms. Please call patient. If not able to reach pt. It is okay to lvm.